# Patient Record
Sex: MALE | Race: WHITE | Employment: FULL TIME | URBAN - METROPOLITAN AREA
[De-identification: names, ages, dates, MRNs, and addresses within clinical notes are randomized per-mention and may not be internally consistent; named-entity substitution may affect disease eponyms.]

---

## 2017-10-19 ENCOUNTER — TELEPHONE (OUTPATIENT)
Dept: NEUROLOGY | Age: 37
End: 2017-10-19

## 2017-10-19 ENCOUNTER — OFFICE VISIT (OUTPATIENT)
Dept: NEUROLOGY | Age: 37
End: 2017-10-19
Payer: COMMERCIAL

## 2017-10-19 VITALS
BODY MASS INDEX: 22.19 KG/M2 | SYSTOLIC BLOOD PRESSURE: 130 MMHG | WEIGHT: 163.8 LBS | HEIGHT: 72 IN | DIASTOLIC BLOOD PRESSURE: 74 MMHG | HEART RATE: 73 BPM

## 2017-10-19 DIAGNOSIS — G40.919 INTRACTABLE EPILEPSY WITHOUT STATUS EPILEPTICUS, UNSPECIFIED EPILEPSY TYPE (HCC): Primary | ICD-10-CM

## 2017-10-19 PROCEDURE — 99213 OFFICE O/P EST LOW 20 MIN: CPT | Performed by: NURSE PRACTITIONER

## 2017-10-19 RX ORDER — LEVETIRACETAM 1000 MG/1
TABLET ORAL
Qty: 60 TABLET | Refills: 11 | Status: SHIPPED | OUTPATIENT
Start: 2017-10-19 | End: 2018-10-22 | Stop reason: ALTCHOICE

## 2017-10-19 NOTE — LETTER
October 19, 2017     [facility] [address] 1997    Patient: Meagan Veliz  MR Number: [id number]  YOB: 1980  Date of Visit: 10/19/2017    Dear Dr. Portillo Mouse:        Mahamed Franklin [address] [place] [address] [place] [id number]  Dept: [phone]  Dept Fax: [phone]  Dennise Ortiz CNP    10/19/2017    HPI:      Your patient, Meagan Veliz returns for continuing neurologic care for seizure disorder. Patient is a 27-year-old man seen in the past by Dr. Yissel Leonard, with his last visit being on September 21, 2016. Patient's records have been carefully reviewed. Patient has complex partial seizures with  secondary generalization and is treated with Keppra 1000 mg twice daily. His last seizure was in January 2011. He tolerates his medication, but has tried to reduce his dosage since his last visit. He was taking only 500 mg daily. I advised him to increase the dosage to 500 mg twice daily cally to the 1/2 life of the drug. He denies any new weakness numbness or tingling in his arms or legs he denies any headache, double vision, blurred vision, or balance difficulties. He did note some moodiness and irritability at the higher dose of Keppra, which is why he reduced the dosage.     Testing:  -MRI of the brain in January 2011 normal  -EEG normal  -Carotid ultrasound normal  Echocardiogram normal  Tilt table test negative      Past Medical History:   Diagnosis Date    History of psychoactive substance use disorder     Unspecified epilepsy with intractable epilepsy          Past Surgical History:   Procedure Laterality Date    ANKLE SURGERY      SHOULDER SURGERY Right     VASECTOMY         Family History   Problem Relation Age of Onset    Diabetes Maternal Grandmother        Social History   Substance Use Topics    Smoking status: Former Smoker     Packs/day: 0.50     Quit date: 10/28/2014    Smokeless tobacco: Former User Nose: Nares normal, mucosa normal, no drainage    Throat: Lips and tongue normal; teeth normal;  gums normal   Neck: Supple, intact flexion, extension and rotation;   trachea midline;  no adenopathy;   thyroid: not enlarged;   no carotid pulse abnormality   Back:   Symmetric, no curvature, ROM adequate   Lungs:   Respirations unlabored   Heart:  Regular rate and rhythm           Extremities: Extremities normal, no cyanosis, no edema   Pulses: Symmetric over head and neck   Skin: Skin color, texture normal, no rashes, no lesions                                             NEUROLOGIC EXAMINATION    Neurologic Exam     Mental Status   Oriented to person, place, and time. Attention: normal.   Speech: speech is normal   Level of consciousness: alert  Normal comprehension. Cranial Nerves     CN II   Visual fields full to confrontation. CN III, IV, VI   Pupils are equal, round, and reactive to light. Extraocular motions are normal.     CN V   Facial sensation intact. CN VII   Facial expression full, symmetric. CN VIII   CN VIII normal.     CN IX, X   CN IX normal.     CN XI   CN XI normal.     CN XII   CN XII normal.     Motor Exam   Muscle bulk: normal  Overall muscle tone: normal  Right arm pronator drift: absent    Strength   Strength 5/5 throughout.      Sensory Exam   Light touch normal.   Vibration normal.   Pinprick normal.     Gait, Coordination, and Reflexes     Gait  Gait: normal    Coordination   Finger to nose coordination: normal    Tremor   Resting tremor: absent    Reflexes   Right brachioradialis: 2+  Left brachioradialis: 2+  Right biceps: 2+  Left biceps: 2+  Right triceps: 2+  Left triceps: 2+  Right patellar: 2+  Left patellar: 2+  Right achilles: 2+  Left achilles: 2+  Right plantar: normal  Left plantar: normal            ASSESSMENT/PLAN:       In summary, your patient, Madelaine Tapia exhibits the following, with associated plan: 1. Seizure disorder, currently well controlled, with his last seizure being in 2011. 1. He will be maintained on Keppra 500 mg twice daily  2. He will return in follow-up in 1 year            Signed: Elly Daniels CNP      If you have questions, please do not hesitate to call me. I look forward to following Digna Sarkar along with you.     Sincerely,        Jonathan Charles CNP

## 2017-10-19 NOTE — PROGRESS NOTES
RESPIRATORY Shortness of breath: absent, Cough: absent   CARDIOVASCULAR Chest pain: absent, Leg swelling :absent      GI Constipation: absent, Diarrhea: absent, Swallowing change: absent       Urinary frequency: absent, Urinary urgency: absent, Urinary incontinence: absent   MUSCULOSKELETAL Neck pain: absent, Back pain: absent, Stiffness: absent, Muscle pain: absent, Joint pain: absent Restless legs: absent   DERMATOLOGIC Hair loss: absent, Skin changes: absent   NEUROLOGIC Memory loss: absent, Confusion: absent, Seizures: absent Trouble walking or imbalance: absent, Dizziness: absent, Weakness: absent, Numbness: absent Tremor: absent, Spasm: absent, Speech difficulty: absent, Headache: absent, Light sensitivity: absent   PSYCHIATRIC Anxiety: absent, Hallucination: absent, Mood disorder: absent   HEMATOLOGIC Abnormal bleeding: absent, Anemia: absent, Clotting disorder: absent, Lymph gland changes: absent           Allergies   Allergen Reactions    Zithromax [Azithromycin Dihydrate] Hives           Current Outpatient Prescriptions   Medication Sig Dispense Refill    levETIRAcetam (KEPPRA) 1000 MG tablet TAKE 1 TABLET BY MOUTH TWO TIMES A DAY  60 tablet 0     No current facility-administered medications for this visit. PHYSICAL EXAMINATION       There were no vitals filed for this visit.                                            .                                                                                                    General Appearance:  Alert, cooperative, no signs of distress, appears stated age   Head:  Normocephalic, no signs of trauma   Eyes:  Conjunctiva/corneas clear;  eyelids intact   Ears:  Normal external ear and canals   Nose: Nares normal, mucosa normal, no drainage    Throat: Lips and tongue normal; teeth normal;  gums normal   Neck: Supple, intact flexion, extension and rotation;   trachea midline;  no adenopathy;   thyroid: not enlarged;   no

## 2018-10-22 ENCOUNTER — OFFICE VISIT (OUTPATIENT)
Dept: NEUROLOGY | Age: 38
End: 2018-10-22
Payer: COMMERCIAL

## 2018-10-22 VITALS
WEIGHT: 180.08 LBS | DIASTOLIC BLOOD PRESSURE: 88 MMHG | HEIGHT: 72 IN | HEART RATE: 64 BPM | BODY MASS INDEX: 24.39 KG/M2 | SYSTOLIC BLOOD PRESSURE: 141 MMHG

## 2018-10-22 DIAGNOSIS — G40.909 SEIZURE DISORDER (HCC): Primary | ICD-10-CM

## 2018-10-22 PROCEDURE — 99214 OFFICE O/P EST MOD 30 MIN: CPT | Performed by: NURSE PRACTITIONER

## 2018-10-22 RX ORDER — LEVETIRACETAM 750 MG/1
750 TABLET, EXTENDED RELEASE ORAL DAILY
Qty: 30 TABLET | Refills: 11 | Status: SHIPPED | OUTPATIENT
Start: 2018-10-22

## 2018-10-22 NOTE — PROGRESS NOTES
[facility]            [address]          [place], [address] [facility]          Dept: [phone]          Dept Fax: [phone]        MD Keith Reyes MD Ahmed B. Jeanetta Bailiff, MD Emmalene Setting, MD Velia Sons, MD Jack Trinidad, CNP            10/22/2018    HPI:      Your patient, Ralph Joya returns for continuing neurologic care. Patient is a 70-year-old man who was seen last on October 19, 2017 for management of complex partial seizures with secondary generalization. Patient's last seizure was in January 2011. Prior to that date, patient had been having complex partial seizures as well as secondary generalization, which were undiagnosed. He has been taking Keppra 500 mg twice daily since that time, but at his visit one year ago he admitted that he was only taking it once daily. I advised him to increase the dosage to twice daily, however he comes today for reevaluation stating that he continues to take it only once daily. The side effects include weight loss, loss of appetite, and mood changes when he increases the dosage of Keppra. Previous testing includes MRI of the brain, EEG, echocardiogram, EEG, and tilt table testing, all of which were normal and done in 2011. She denies headaches, blurred or double vision, weakness numbness or tingling in his extremities, gait or balance difficulties.                   Past Medical History:   Diagnosis Date    History of psychoactive substance use disorder     Unspecified epilepsy with intractable epilepsy          Past Surgical History:   Procedure Laterality Date    ANKLE SURGERY      SHOULDER SURGERY Right     VASECTOMY         Family History   Problem Relation Age of Onset    Diabetes Maternal Grandmother        Social History   Substance Use Topics    Smoking status: Former Smoker     Packs/day: 0.50     Quit date: 10/28/2014    Smokeless tobacco: Former User      Comment: rarely chews    Alcohol use No                               REVIEW OF SYSTEMS    CONSTITUTIONAL Weight: absent, Appetite: absent, Fatigue: absent      HEENT Ears: normal, Visual disturbance: absent   RESPIRATORY Shortness of breath: absent, Cough: absent   CARDIOVASCULAR Chest pain: absent, Leg swelling :absent      GI Constipation: absent, Diarrhea: absent, Swallowing change: absent       Urinary frequency: absent, Urinary urgency: absent, Urinary incontinence: absent   MUSCULOSKELETAL Neck pain: absent, Back pain: absent, Stiffness: absent, Muscle pain: absent, Joint pain: absent Restless legs: absent   DERMATOLOGIC Hair loss: absent, Skin changes: absent   NEUROLOGIC Memory loss: absent, Confusion: absent, Seizures: absent Trouble walking or imbalance: absent, Dizziness: absent, Weakness: absent, Numbness: absent Tremor: absent, Spasm: absent, Speech difficulty: absent, Headache: absent, Light sensitivity: absent   PSYCHIATRIC Anxiety: absent, Hallucination: absent, Mood disorder: absent   HEMATOLOGIC Abnormal bleeding: absent, Anemia: absent, Clotting disorder: absent, Lymph gland changes: absent           Allergies   Allergen Reactions    Zithromax [Azithromycin Dihydrate] Hives           Current Outpatient Prescriptions   Medication Sig Dispense Refill    levETIRAcetam (KEPPRA) 1000 MG tablet TAKE 1 TABLET BY MOUTH TWO TIMES A DAY 60 tablet 11     No current facility-administered medications for this visit. PHYSICAL EXAMINATION       BP (!) 141/88 (Site: Right Upper Arm, Position: Sitting)   Pulse 64   Ht 6' (1.829 m)   Wt 180 lb 1.3 oz (81.7 kg)   BMI 24.42 kg/m²                                             .                                                                                                       General Appearance:  Alert, cooperative, no signs of distress, appears stated age   Head: 2+  Right achilles: 2+  Left achilles: 2+  Right plantar: normal  Left plantar: normal            ASSESSMENT/PLAN:       In summary, your patientMatheus exhibits the following, with associated plan:    1. Topics partial seizure disorder with secondary generalization with side effects to levetiracetam.  Patient has been on another antiepileptic drug prior to taking Keppra and does not wish to try any additional medications. 1. For better tolerability, we will place the patient on Keppra  mg daily. 2. We talked about supplementation with a vitamin B6, but patient declines at this time  3. Patient will call the office if he has any side effects to the new formulation of Keppra  4.  Patient will otherwise continue to follow on an annual basis            Signed: Andrei Galloway CNP      *Please note that portions of this note were completed with a voice recognition program.  Although every effort was made to insure the accuracy of this automated transcription, some errors in transcription may have occurred, occasionally words and are mis-transcribed